# Patient Record
Sex: MALE | Race: WHITE
[De-identification: names, ages, dates, MRNs, and addresses within clinical notes are randomized per-mention and may not be internally consistent; named-entity substitution may affect disease eponyms.]

---

## 2020-05-30 ENCOUNTER — HOSPITAL ENCOUNTER (EMERGENCY)
Dept: HOSPITAL 100 - ED | Age: 40
Discharge: HOME | End: 2020-05-30
Payer: COMMERCIAL

## 2020-05-30 VITALS
HEART RATE: 56 BPM | OXYGEN SATURATION: 99 % | TEMPERATURE: 97.8 F | DIASTOLIC BLOOD PRESSURE: 86 MMHG | SYSTOLIC BLOOD PRESSURE: 146 MMHG | RESPIRATION RATE: 16 BRPM

## 2020-05-30 VITALS — RESPIRATION RATE: 16 BRPM

## 2020-05-30 VITALS — BODY MASS INDEX: 30.5 KG/M2

## 2020-05-30 DIAGNOSIS — Z87.891: ICD-10-CM

## 2020-05-30 DIAGNOSIS — H00.031: Primary | ICD-10-CM

## 2020-05-30 PROCEDURE — 99282 EMERGENCY DEPT VISIT SF MDM: CPT

## 2021-02-15 ENCOUNTER — HOSPITAL ENCOUNTER (OUTPATIENT)
Dept: HOSPITAL 100 - LABSPEC | Age: 41
Discharge: HOME | End: 2021-02-15
Payer: COMMERCIAL

## 2021-02-15 VITALS — BODY MASS INDEX: 28.6 KG/M2

## 2021-02-15 DIAGNOSIS — E04.2: Primary | ICD-10-CM

## 2021-02-15 PROCEDURE — 88313 SPECIAL STAINS GROUP 2: CPT

## 2021-02-15 PROCEDURE — 88161 CYTOPATH SMEAR OTHER SOURCE: CPT

## 2022-02-02 ENCOUNTER — HOSPITAL ENCOUNTER (OUTPATIENT)
Age: 42
Discharge: ANOTHER HOSPITAL | End: 2022-02-02
Payer: COMMERCIAL

## 2022-02-02 DIAGNOSIS — E04.2: Primary | ICD-10-CM

## 2022-02-02 PROCEDURE — 76536 US EXAM OF HEAD AND NECK: CPT

## 2022-03-10 LAB
ANION GAP: 2 (ref 5–15)
BUN SERPL-MCNC: 19 MG/DL (ref 7–18)
BUN/CREAT RATIO: 17.1 RATIO (ref 10–20)
CALCIUM SERPL-MCNC: 9.3 MG/DL (ref 8.5–10.1)
CARBON DIOXIDE: 32 MMOL/L (ref 21–32)
CHLORIDE: 105 MMOL/L (ref 98–107)
DEPRECATED RDW RBC: 40.6 FL (ref 35.1–43.9)
ERYTHROCYTE [DISTWIDTH] IN BLOOD: 12.8 % (ref 11.6–14.6)
EST GLOM FILT RATE - AFR AMER: 94 ML/MIN (ref 60–?)
GLUCOSE: 121 MG/DL (ref 74–106)
HCT VFR BLD AUTO: 43.9 % (ref 40–54)
HEMOGLOBIN: 15 G/DL (ref 13–16.5)
HGB BLD-MCNC: 15 G/DL (ref 13–16.5)
MCV RBC: 87.3 FL (ref 80–94)
MEAN CORP HGB CONC: 34.2 G/DL (ref 32–36)
MEAN PLATELET VOL.: 10.6 FL (ref 6.2–12)
PLATELET # BLD: 181 K/MM3 (ref 150–450)
PLATELET COUNT: 181 K/MM3 (ref 150–450)
POTASSIUM: 4.1 MMOL/L (ref 3.5–5.1)
RBC # BLD AUTO: 5.03 M/MM3 (ref 4.6–6.2)
RBC DISTRIBUTION WIDTH CV: 12.8 % (ref 11.6–14.6)
RBC DISTRIBUTION WIDTH SD: 40.6 FL (ref 35.1–43.9)
WBC # BLD AUTO: 6.2 K/MM3 (ref 4.4–11)
WHITE BLOOD COUNT: 6.2 K/MM3 (ref 4.4–11)

## 2022-03-16 ENCOUNTER — HOSPITAL ENCOUNTER (OUTPATIENT)
Dept: HOSPITAL 100 - SDC | Age: 42
Discharge: HOME | End: 2022-03-16
Payer: COMMERCIAL

## 2022-03-16 VITALS
HEART RATE: 62 BPM | SYSTOLIC BLOOD PRESSURE: 132 MMHG | RESPIRATION RATE: 14 BRPM | OXYGEN SATURATION: 97 % | DIASTOLIC BLOOD PRESSURE: 81 MMHG

## 2022-03-16 VITALS — BODY MASS INDEX: 30.1 KG/M2

## 2022-03-16 VITALS
HEART RATE: 59 BPM | OXYGEN SATURATION: 98 % | SYSTOLIC BLOOD PRESSURE: 132 MMHG | RESPIRATION RATE: 14 BRPM | DIASTOLIC BLOOD PRESSURE: 71 MMHG

## 2022-03-16 VITALS
TEMPERATURE: 97.16 F | HEART RATE: 59 BPM | RESPIRATION RATE: 14 BRPM | DIASTOLIC BLOOD PRESSURE: 78 MMHG | SYSTOLIC BLOOD PRESSURE: 132 MMHG | OXYGEN SATURATION: 94 %

## 2022-03-16 VITALS
TEMPERATURE: 98.96 F | SYSTOLIC BLOOD PRESSURE: 132 MMHG | OXYGEN SATURATION: 97 % | DIASTOLIC BLOOD PRESSURE: 69 MMHG | RESPIRATION RATE: 16 BRPM | HEART RATE: 57 BPM

## 2022-03-16 VITALS
OXYGEN SATURATION: 98 % | HEART RATE: 60 BPM | SYSTOLIC BLOOD PRESSURE: 125 MMHG | RESPIRATION RATE: 14 BRPM | DIASTOLIC BLOOD PRESSURE: 97 MMHG

## 2022-03-16 VITALS
HEART RATE: 82 BPM | OXYGEN SATURATION: 94 % | DIASTOLIC BLOOD PRESSURE: 97 MMHG | SYSTOLIC BLOOD PRESSURE: 132 MMHG | RESPIRATION RATE: 14 BRPM

## 2022-03-16 VITALS
RESPIRATION RATE: 16 BRPM | SYSTOLIC BLOOD PRESSURE: 132 MMHG | TEMPERATURE: 97.7 F | HEART RATE: 52 BPM | OXYGEN SATURATION: 97 % | DIASTOLIC BLOOD PRESSURE: 97 MMHG

## 2022-03-16 VITALS
SYSTOLIC BLOOD PRESSURE: 132 MMHG | TEMPERATURE: 97.16 F | DIASTOLIC BLOOD PRESSURE: 97 MMHG | HEART RATE: 56 BPM | OXYGEN SATURATION: 98 % | RESPIRATION RATE: 16 BRPM

## 2022-03-16 VITALS
OXYGEN SATURATION: 98 % | HEART RATE: 48 BPM | DIASTOLIC BLOOD PRESSURE: 97 MMHG | SYSTOLIC BLOOD PRESSURE: 132 MMHG | TEMPERATURE: 98.4 F | RESPIRATION RATE: 14 BRPM

## 2022-03-16 VITALS
TEMPERATURE: 97.9 F | RESPIRATION RATE: 14 BRPM | SYSTOLIC BLOOD PRESSURE: 132 MMHG | HEART RATE: 75 BPM | DIASTOLIC BLOOD PRESSURE: 97 MMHG | OXYGEN SATURATION: 93 %

## 2022-03-16 VITALS
RESPIRATION RATE: 16 BRPM | TEMPERATURE: 98.96 F | OXYGEN SATURATION: 96 % | SYSTOLIC BLOOD PRESSURE: 132 MMHG | HEART RATE: 52 BPM | DIASTOLIC BLOOD PRESSURE: 75 MMHG

## 2022-03-16 VITALS
DIASTOLIC BLOOD PRESSURE: 97 MMHG | OXYGEN SATURATION: 94 % | RESPIRATION RATE: 14 BRPM | HEART RATE: 77 BPM | SYSTOLIC BLOOD PRESSURE: 132 MMHG

## 2022-03-16 DIAGNOSIS — E04.2: Primary | ICD-10-CM

## 2022-03-16 LAB — CALCIUM SERPL-MCNC: 9.2 MG/DL (ref 8.5–10.1)

## 2022-03-16 PROCEDURE — 80048 BASIC METABOLIC PNL TOTAL CA: CPT

## 2022-03-16 PROCEDURE — 82310 ASSAY OF CALCIUM: CPT

## 2022-03-16 PROCEDURE — 87426 SARSCOV CORONAVIRUS AG IA: CPT

## 2022-03-16 PROCEDURE — 00320 ANES ALL PX NECK NOS 1YR/>: CPT

## 2022-03-16 PROCEDURE — 85027 COMPLETE CBC AUTOMATED: CPT

## 2022-03-16 PROCEDURE — 84100 ASSAY OF PHOSPHORUS: CPT

## 2022-03-16 PROCEDURE — C9803 HOPD COVID-19 SPEC COLLECT: HCPCS

## 2022-03-16 PROCEDURE — 60240 REMOVAL OF THYROID: CPT

## 2022-03-16 PROCEDURE — 36415 COLL VENOUS BLD VENIPUNCTURE: CPT

## 2022-03-16 PROCEDURE — 88307 TISSUE EXAM BY PATHOLOGIST: CPT

## 2022-03-28 ENCOUNTER — HOSPITAL ENCOUNTER (OUTPATIENT)
Dept: HOSPITAL 100 - PAVLAB | Age: 42
Discharge: HOME | End: 2022-03-28
Payer: COMMERCIAL

## 2022-03-28 DIAGNOSIS — E04.2: Primary | ICD-10-CM

## 2022-03-28 LAB — CALCIUM SERPL-MCNC: 9.4 MG/DL (ref 8.5–10.1)

## 2022-03-28 PROCEDURE — 36415 COLL VENOUS BLD VENIPUNCTURE: CPT

## 2022-03-28 PROCEDURE — 84443 ASSAY THYROID STIM HORMONE: CPT

## 2022-03-28 PROCEDURE — 82310 ASSAY OF CALCIUM: CPT

## 2022-04-26 ENCOUNTER — HOSPITAL ENCOUNTER (OUTPATIENT)
Dept: HOSPITAL 100 - PAVLAB | Age: 42
Discharge: HOME | End: 2022-04-26
Payer: COMMERCIAL

## 2022-04-26 DIAGNOSIS — E89.0: Primary | ICD-10-CM

## 2022-04-26 PROCEDURE — 36415 COLL VENOUS BLD VENIPUNCTURE: CPT

## 2022-04-26 PROCEDURE — 84443 ASSAY THYROID STIM HORMONE: CPT

## 2022-05-21 ENCOUNTER — HOSPITAL ENCOUNTER (OUTPATIENT)
Dept: HOSPITAL 100 - LAB | Age: 42
Discharge: HOME | End: 2022-05-21
Payer: COMMERCIAL

## 2022-05-21 DIAGNOSIS — E04.2: Primary | ICD-10-CM

## 2022-05-21 PROCEDURE — 36415 COLL VENOUS BLD VENIPUNCTURE: CPT

## 2022-05-21 PROCEDURE — 84443 ASSAY THYROID STIM HORMONE: CPT

## 2023-11-18 ENCOUNTER — HOSPITAL ENCOUNTER (EMERGENCY)
Age: 43
Discharge: HOME | End: 2023-11-18
Payer: COMMERCIAL

## 2023-11-18 VITALS
OXYGEN SATURATION: 95 % | RESPIRATION RATE: 14 BRPM | DIASTOLIC BLOOD PRESSURE: 90 MMHG | SYSTOLIC BLOOD PRESSURE: 136 MMHG | TEMPERATURE: 97.52 F | HEART RATE: 64 BPM

## 2023-11-18 VITALS — TEMPERATURE: 97.88 F

## 2023-11-18 DIAGNOSIS — M25.561: Primary | ICD-10-CM

## 2023-11-18 DIAGNOSIS — Z98.890: ICD-10-CM

## 2023-11-18 DIAGNOSIS — R60.9: ICD-10-CM

## 2023-11-18 DIAGNOSIS — Z79.899: ICD-10-CM

## 2023-11-18 DIAGNOSIS — E03.9: ICD-10-CM

## 2023-11-18 PROCEDURE — 73564 X-RAY EXAM KNEE 4 OR MORE: CPT

## 2023-11-18 PROCEDURE — 99282 EMERGENCY DEPT VISIT SF MDM: CPT

## 2024-01-10 ENCOUNTER — HOSPITAL ENCOUNTER (EMERGENCY)
Age: 44
Discharge: HOME | End: 2024-01-10
Payer: COMMERCIAL

## 2024-01-10 VITALS
DIASTOLIC BLOOD PRESSURE: 83 MMHG | OXYGEN SATURATION: 100 % | RESPIRATION RATE: 18 BRPM | HEART RATE: 60 BPM | SYSTOLIC BLOOD PRESSURE: 139 MMHG

## 2024-01-10 VITALS — SYSTOLIC BLOOD PRESSURE: 143 MMHG | HEART RATE: 66 BPM | RESPIRATION RATE: 16 BRPM | DIASTOLIC BLOOD PRESSURE: 96 MMHG

## 2024-01-10 VITALS
DIASTOLIC BLOOD PRESSURE: 97 MMHG | OXYGEN SATURATION: 99 % | SYSTOLIC BLOOD PRESSURE: 141 MMHG | TEMPERATURE: 97.34 F | HEART RATE: 66 BPM | RESPIRATION RATE: 14 BRPM

## 2024-01-10 VITALS — BODY MASS INDEX: 31.6 KG/M2

## 2024-01-10 DIAGNOSIS — E03.9: ICD-10-CM

## 2024-01-10 DIAGNOSIS — Z79.899: ICD-10-CM

## 2024-01-10 DIAGNOSIS — E89.2: ICD-10-CM

## 2024-01-10 DIAGNOSIS — R07.9: Primary | ICD-10-CM

## 2024-01-10 LAB
ANION GAP: 4 (ref 5–15)
BUN SERPL-MCNC: 20 MG/DL (ref 7–18)
BUN/CREAT RATIO: 14.9 RATIO (ref 10–20)
CALCIUM SERPL-MCNC: 9.4 MG/DL (ref 8.5–10.1)
CARBON DIOXIDE: 27 MMOL/L (ref 21–32)
CARDIAC TROPONIN I PNL SERPL HS: 4 PG/ML (ref 3–78)
CHLORIDE: 108 MMOL/L (ref 98–107)
D-DIMER QUANTITATIVE (DVT/PE): 0.28 FEU/UG/M (ref 0.27–0.49)
DEPRECATED RDW RBC: 45.1 FL (ref 35.1–43.9)
ERYTHROCYTE [DISTWIDTH] IN BLOOD: 14 % (ref 11.6–14.6)
EST GLOM FILT RATE - AFR AMER: 75 ML/MIN (ref 60–?)
ESTIMATED CREATININE CLEARANCE: 86.85 ML/MIN
GLUCOSE: 99 MG/DL (ref 74–106)
HCT VFR BLD AUTO: 45.4 % (ref 40–54)
HGB BLD-MCNC: 15.2 G/DL (ref 13–16.5)
IMMATURE GRANULOCYTES COUNT: 0.05 X10^3/UL (ref 0–0)
MCV RBC: 88.8 FL (ref 80–94)
MEAN CORP HGB CONC: 33.5 G/DL (ref 32–36)
MEAN PLATELET VOL.: 10.5 FL (ref 6.2–12)
NRBC FLAGGED BY ANALYZER: 0 % (ref 0–5)
PLATELET # BLD: 176 K/MM3 (ref 150–450)
POTASSIUM: 4.3 MMOL/L (ref 3.5–5.1)
RBC # BLD AUTO: 5.11 M/MM3 (ref 4.6–6.2)
TROPONIN-I HS: 4 PG/ML (ref 3–78)
WBC # BLD AUTO: 8.8 K/MM3 (ref 4.4–11)

## 2024-01-10 PROCEDURE — 93005 ELECTROCARDIOGRAM TRACING: CPT

## 2024-01-10 PROCEDURE — 96360 HYDRATION IV INFUSION INIT: CPT

## 2024-01-10 PROCEDURE — 99284 EMERGENCY DEPT VISIT MOD MDM: CPT

## 2024-01-10 PROCEDURE — 80048 BASIC METABOLIC PNL TOTAL CA: CPT

## 2024-01-10 PROCEDURE — 84484 ASSAY OF TROPONIN QUANT: CPT

## 2024-01-10 PROCEDURE — 71045 X-RAY EXAM CHEST 1 VIEW: CPT

## 2024-01-10 PROCEDURE — 85025 COMPLETE CBC W/AUTO DIFF WBC: CPT

## 2024-01-10 PROCEDURE — A4216 STERILE WATER/SALINE, 10 ML: HCPCS

## 2024-01-10 PROCEDURE — 85379 FIBRIN DEGRADATION QUANT: CPT

## 2024-03-17 ENCOUNTER — HOSPITAL ENCOUNTER (EMERGENCY)
Age: 44
Discharge: HOME | End: 2024-03-17
Payer: COMMERCIAL

## 2024-03-17 VITALS
HEART RATE: 74 BPM | DIASTOLIC BLOOD PRESSURE: 85 MMHG | RESPIRATION RATE: 16 BRPM | SYSTOLIC BLOOD PRESSURE: 122 MMHG | OXYGEN SATURATION: 99 % | TEMPERATURE: 97.9 F

## 2024-03-17 VITALS
OXYGEN SATURATION: 99 % | TEMPERATURE: 98.3 F | HEART RATE: 48 BPM | DIASTOLIC BLOOD PRESSURE: 82 MMHG | SYSTOLIC BLOOD PRESSURE: 132 MMHG | RESPIRATION RATE: 12 BRPM

## 2024-03-17 VITALS — BODY MASS INDEX: 32 KG/M2

## 2024-03-17 DIAGNOSIS — Z79.899: ICD-10-CM

## 2024-03-17 DIAGNOSIS — R07.89: Primary | ICD-10-CM

## 2024-03-17 DIAGNOSIS — R10.9: ICD-10-CM

## 2024-03-17 DIAGNOSIS — E03.9: ICD-10-CM

## 2024-03-17 LAB
ALANINE AMINOTRANSFER ALT/SGPT: 41 U/L (ref 16–61)
ALBUMIN SERPL-MCNC: 4.1 G/DL (ref 3.2–5)
ALKALINE PHOSPHATASE: 69 U/L (ref 45–117)
ANION GAP: 6 (ref 5–15)
AST(SGOT): 26 U/L (ref 15–37)
BUN SERPL-MCNC: 19 MG/DL (ref 7–18)
BUN/CREAT RATIO: 16.2 RATIO (ref 10–20)
CALCIUM SERPL-MCNC: 8.5 MG/DL (ref 8.5–10.1)
CARBON DIOXIDE: 24 MMOL/L (ref 21–32)
CHLORIDE: 109 MMOL/L (ref 98–107)
D-DIMER QUANTITATIVE (DVT/PE): 0.27 FEU/UG/M (ref 0.27–0.49)
DEPRECATED RDW RBC: 40.9 FL (ref 35.1–43.9)
ERYTHROCYTE [DISTWIDTH] IN BLOOD: 12.3 % (ref 11.6–14.6)
EST GLOM FILT RATE - AFR AMER: 87 ML/MIN (ref 60–?)
ESTIMATED CREATININE CLEARANCE: 99.97 ML/MIN
GLOBULIN: 3.2 G/DL (ref 2.2–4.2)
GLUCOSE: 105 MG/DL (ref 74–106)
HCT VFR BLD AUTO: 42.7 % (ref 40–54)
HGB BLD-MCNC: 14.6 G/DL (ref 13–16.5)
IMMATURE GRANULOCYTES COUNT: 0.01 X10^3/UL (ref 0–0)
LIPASE: 20 U/L (ref 13–75)
MCV RBC: 90.5 FL (ref 80–94)
MEAN CORP HGB CONC: 34.2 G/DL (ref 32–36)
MEAN PLATELET VOL.: 10.6 FL (ref 6.2–12)
MUCOUS THREADS URNS QL MICRO: (no result) /HPF
NRBC FLAGGED BY ANALYZER: 0 % (ref 0–5)
PLATELET # BLD: 151 K/MM3 (ref 150–450)
POTASSIUM: 4.1 MMOL/L (ref 3.5–5.1)
PROT UR QL STRIP.AUTO: 15 MG/DL
RBC # BLD AUTO: 4.72 M/MM3 (ref 4.6–6.2)
RBC UR QL: (no result) /HPF (ref 0–5)
SP GR UR: 1.02 (ref 1–1.03)
SQUAMOUS URNS QL MICRO: (no result) /HPF (ref 0–5)
URINE PRESERVATIVE: (no result)
WBC # BLD AUTO: 6.3 K/MM3 (ref 4.4–11)

## 2024-03-17 PROCEDURE — 81001 URINALYSIS AUTO W/SCOPE: CPT

## 2024-03-17 PROCEDURE — 83605 ASSAY OF LACTIC ACID: CPT

## 2024-03-17 PROCEDURE — 80053 COMPREHEN METABOLIC PANEL: CPT

## 2024-03-17 PROCEDURE — 83690 ASSAY OF LIPASE: CPT

## 2024-03-17 PROCEDURE — 85379 FIBRIN DEGRADATION QUANT: CPT

## 2024-03-17 PROCEDURE — 74177 CT ABD & PELVIS W/CONTRAST: CPT

## 2024-03-17 PROCEDURE — 85025 COMPLETE CBC W/AUTO DIFF WBC: CPT

## 2024-03-17 PROCEDURE — 71045 X-RAY EXAM CHEST 1 VIEW: CPT

## 2024-03-17 PROCEDURE — 93005 ELECTROCARDIOGRAM TRACING: CPT

## 2024-03-17 PROCEDURE — A4216 STERILE WATER/SALINE, 10 ML: HCPCS

## 2024-03-17 PROCEDURE — 99283 EMERGENCY DEPT VISIT LOW MDM: CPT

## 2024-12-05 ENCOUNTER — HOSPITAL ENCOUNTER (EMERGENCY)
Age: 44
Discharge: HOME | End: 2024-12-05
Payer: COMMERCIAL

## 2024-12-05 VITALS
RESPIRATION RATE: 16 BRPM | TEMPERATURE: 97.88 F | HEART RATE: 56 BPM | SYSTOLIC BLOOD PRESSURE: 138 MMHG | OXYGEN SATURATION: 98 % | DIASTOLIC BLOOD PRESSURE: 94 MMHG

## 2024-12-05 VITALS
SYSTOLIC BLOOD PRESSURE: 150 MMHG | DIASTOLIC BLOOD PRESSURE: 103 MMHG | TEMPERATURE: 98.24 F | HEART RATE: 63 BPM | OXYGEN SATURATION: 98 % | RESPIRATION RATE: 18 BRPM

## 2024-12-05 VITALS — BODY MASS INDEX: 30.8 KG/M2

## 2024-12-05 DIAGNOSIS — R03.0: ICD-10-CM

## 2024-12-05 DIAGNOSIS — R60.0: ICD-10-CM

## 2024-12-05 DIAGNOSIS — E89.0: ICD-10-CM

## 2024-12-05 DIAGNOSIS — J06.9: ICD-10-CM

## 2024-12-05 DIAGNOSIS — Z79.890: ICD-10-CM

## 2024-12-05 DIAGNOSIS — R06.02: ICD-10-CM

## 2024-12-05 DIAGNOSIS — R05.9: Primary | ICD-10-CM

## 2024-12-05 LAB
ANION GAP: 5 (ref 5–15)
BNP,B-TYPE NATRIURETIC PEPTIDE: 5.1 PG/ML (ref 0–100)
BUN SERPL-MCNC: 19 MG/DL (ref 7–18)
BUN/CREAT RATIO: 12.3 RATIO (ref 10–20)
CALCIUM SERPL-MCNC: 8.9 MG/DL (ref 8.5–10.1)
CARBON DIOXIDE: 28 MMOL/L (ref 21–32)
CHLORIDE: 106 MMOL/L (ref 98–107)
DEPRECATED RDW RBC: 44.2 FL (ref 35.1–43.9)
ERYTHROCYTE [DISTWIDTH] IN BLOOD: 13.5 % (ref 11.6–14.6)
EST GLOM FILT RATE - AFR AMER: 63 ML/MIN (ref 60–?)
ESTIMATED CREATININE CLEARANCE: 73.85 ML/MIN
GLUCOSE: 105 MG/DL (ref 74–106)
HCT VFR BLD AUTO: 41.6 % (ref 40–54)
HEMOGLOBIN: 14.4 G/DL (ref 13–16.5)
HGB BLD-MCNC: 14.4 G/DL (ref 13–16.5)
IMMATURE GRANULOCYTES COUNT: 0.03 X10^3/UL (ref 0–0)
MCV RBC: 90 FL (ref 80–94)
MEAN CORP HGB CONC: 34.6 G/DL (ref 32–36)
MEAN PLATELET VOL.: 10.4 FL (ref 6.2–12)
NRBC FLAGGED BY ANALYZER: 0 % (ref 0–5)
PLATELET # BLD: 169 K/MM3 (ref 150–450)
PLATELET COUNT: 169 K/MM3 (ref 150–450)
POTASSIUM: 3.9 MMOL/L (ref 3.5–5.1)
RBC # BLD AUTO: 4.62 M/MM3 (ref 4.6–6.2)
RBC DISTRIBUTION WIDTH CV: 13.5 % (ref 11.6–14.6)
RBC DISTRIBUTION WIDTH SD: 44.2 FL (ref 35.1–43.9)
TROPONIN-I HS: 4 PG/ML (ref 3–78)
WBC # BLD AUTO: 7.5 K/MM3 (ref 4.4–11)
WHITE BLOOD COUNT: 7.5 K/MM3 (ref 4.4–11)

## 2024-12-05 PROCEDURE — 84484 ASSAY OF TROPONIN QUANT: CPT

## 2024-12-05 PROCEDURE — 93005 ELECTROCARDIOGRAM TRACING: CPT

## 2024-12-05 PROCEDURE — 93970 EXTREMITY STUDY: CPT

## 2024-12-05 PROCEDURE — 83880 ASSAY OF NATRIURETIC PEPTIDE: CPT

## 2024-12-05 PROCEDURE — 85025 COMPLETE CBC W/AUTO DIFF WBC: CPT

## 2024-12-05 PROCEDURE — A4216 STERILE WATER/SALINE, 10 ML: HCPCS

## 2024-12-05 PROCEDURE — 80048 BASIC METABOLIC PNL TOTAL CA: CPT

## 2024-12-05 PROCEDURE — 71046 X-RAY EXAM CHEST 2 VIEWS: CPT

## 2024-12-05 PROCEDURE — 99284 EMERGENCY DEPT VISIT MOD MDM: CPT

## 2025-08-04 ENCOUNTER — APPOINTMENT (OUTPATIENT)
Dept: RADIOLOGY | Facility: HOSPITAL | Age: 45
End: 2025-08-04
Payer: COMMERCIAL

## 2025-08-04 ENCOUNTER — HOSPITAL ENCOUNTER (EMERGENCY)
Facility: HOSPITAL | Age: 45
Discharge: HOME | End: 2025-08-04
Payer: COMMERCIAL

## 2025-08-04 VITALS
TEMPERATURE: 98.6 F | HEART RATE: 63 BPM | HEIGHT: 68 IN | BODY MASS INDEX: 31.07 KG/M2 | OXYGEN SATURATION: 98 % | DIASTOLIC BLOOD PRESSURE: 76 MMHG | RESPIRATION RATE: 14 BRPM | SYSTOLIC BLOOD PRESSURE: 121 MMHG | WEIGHT: 205 LBS

## 2025-08-04 DIAGNOSIS — S46.211A TEAR OF RIGHT BICEPS MUSCLE, INITIAL ENCOUNTER: Primary | ICD-10-CM

## 2025-08-04 PROCEDURE — 99283 EMERGENCY DEPT VISIT LOW MDM: CPT

## 2025-08-04 PROCEDURE — 73030 X-RAY EXAM OF SHOULDER: CPT | Mod: RIGHT SIDE | Performed by: SURGERY

## 2025-08-04 PROCEDURE — 73030 X-RAY EXAM OF SHOULDER: CPT | Mod: RT

## 2025-08-04 ASSESSMENT — PAIN - FUNCTIONAL ASSESSMENT: PAIN_FUNCTIONAL_ASSESSMENT: 0-10

## 2025-08-04 ASSESSMENT — PAIN SCALES - GENERAL: PAINLEVEL_OUTOF10: 8

## 2025-08-04 ASSESSMENT — PAIN DESCRIPTION - ORIENTATION: ORIENTATION: RIGHT

## 2025-08-04 ASSESSMENT — PAIN DESCRIPTION - PAIN TYPE: TYPE: ACUTE PAIN

## 2025-08-04 ASSESSMENT — PAIN DESCRIPTION - LOCATION: LOCATION: ARM

## 2025-08-04 NOTE — Clinical Note
Jimmie Gibbs was seen and treated in our emergency department on 8/4/2025.  He may return to work on 08/11/2025.       If you have any questions or concerns, please don't hesitate to call.      Amando Hernandez MD

## 2025-08-05 NOTE — ED PROVIDER NOTES
HPI   Chief Complaint   Patient presents with    right arm pain, no injury       HPI  Patient is a 44-year-old male presents emergency department for right arm pain.  Patient states yesterday he was lifting a fridge when he had sudden onset right bicep pain.  He is worried that he has torn his right bicep.  Has taken ibuprofen with minimal relief.  States the pain in his right arm intermittently radiates to the right side of his neck.  Denies numbness or weakness in his arms or legs, headache, dizziness.                    A[place] Coma Scale Score: 15                  Patient History   Medical History[1]  Surgical History[2]  Family History[3]  Social History[4]    Physical Exam   ED Triage Vitals [08/04/25 1559]   Temperature Heart Rate Respirations BP   37 °C (98.6 °F) 63 14 121/76      Pulse Ox Temp src Heart Rate Source Patient Position   98 % -- -- --      BP Location FiO2 (%)     -- --         Physical Exam  General: Resting comfortably in bed. No acute distress. Non-toxic.   Head: Atraumatic, normocephalic.   Eyes: Sclera anicteric.  Neck: No midline C-spine tenderness.  Range of motion intact.  Heart: Regular rate and rhythm.  Equal bilateral radial pulses.  Lungs: Normal respiratory pattern without conversational dyspnea or respiratory distress.   Neurologic: Awake and alert, normal speech and mental status. Moves all extremities equally well.   Skin: Warm and dry  Musculoskeletal: Mild right bicep Darren deformity compared to the left.  Right bicep and shoulder tenderness.  Full range of motion intact right shoulder and right elbow.  Sensation motor intact bilateral upper extremities.      ED Course & MDM   Medical Decision Making    Patient is a 44 y.o. male who presents to the emergency department with chief complaint of right arm pain. History of present illness as above. Chronic conditions impacting care include none. Patient remained afebrile and hemodynamically stable while in the emergency  department. They saturated well on room air. The differential diagnosis associated with this patient's presentation includes, but are not limited too, bicep tendon tear, muscle strain, bony injury. Our workup consisted of ordering/reviewing x-ray of right shoulder.  Patient offered pain medication, however he declines at this time    External records reviewed:  Care everywhere reviewed for current medications and medical history.     Diagnostics interpreted by me include:  See ED course      ED Course as of 08/09/25 2301   Mon Aug 04, 2025   2042 XR shoulder right 2+ views  X-ray right shoulder interpreted by me with no fractures or dislocations.  Radiology read:    FINDINGS:  Three views of the right shoulder.      No acute fracture or malalignment.      Moderate to severe acromioclavicular and glenohumeral  osteoarthropathy.      Soft tissues appear normal.   [ERNESTO]      ED Course User Index  [ERNESTO] Amando Hernandez MD         Diagnoses as of 08/09/25 2301   Tear of right biceps muscle, initial encounter     As patient does have a mild right bicep Darren deformity compared to the left, concern for bicep tendon tear.  Patient referred to orthopedic surgery.  Is otherwise stable for discharge.  Appropriate return precautions discussed.  Discharged in stable condition.    Social determinants of health affecting care:  none    ED Medications managed:  Medications - No data to display      Procedure  Procedures    *This report was transcribed using voice recognition software.  Every effort was made to ensure accuracy; however, inadvertent computerized transcription errors may be present.*          [1] No past medical history on file.  [2] No past surgical history on file.  [3] No family history on file.  [4]   Social History  Tobacco Use    Smoking status: Not on file    Smokeless tobacco: Not on file   Substance Use Topics    Alcohol use: Not on file    Drug use: Not on file        Amando Hernandez MD  08/09/25  [id number]

## 2025-08-05 NOTE — DISCHARGE INSTRUCTIONS
The x-ray of your shoulder does not show any broken bones or dislocations.  It is possible that you may have torn your right bicep muscle.  He referred to orthopedic surgery for further evaluation.  Do not engage in any lifting or strain to the right arm.  Return the emergency department for any new or worsening symptoms.